# Patient Record
(demographics unavailable — no encounter records)

---

## 2025-04-11 NOTE — BIRTH HISTORY
[At ___ Weeks Gestation] : at [unfilled] weeks gestation [Normal Vaginal Route] : by normal vaginal route [None] : No maternal complications [Status Unknown] : status unknown

## 2025-04-11 NOTE — REVIEW OF SYSTEMS
[Negative] : Heme/Lymph [de-identified] : as per HPI  [de-identified] : as per HPI  [de-identified] : as per HPI

## 2025-04-11 NOTE — CONSULT LETTER
[Consult Letter:] : I had the pleasure of evaluating your patient, [unfilled]. [Please see my note below.] : Please see my note below. [Consult Closing:] : Thank you very much for allowing me to participate in the care of this patient.  If you have any questions, please do not hesitate to contact me. [Sincerely,] : Sincerely, [FreeTextEntry3] : Corey Du MD Pediatric Otolaryngology [address] [address] Tel [phone] Fax [phone]

## 2025-04-11 NOTE — DATA REVIEWED
[FreeTextEntry1] : history obtained from primary caregiver as independent historian due to patient's developmental age and limited recall  referral documents   - OSH documents: NBHS and  [facility] (passed bilaterally)

## 2025-04-11 NOTE — HISTORY OF PRESENT ILLNESS
[Snoring] : snoring [No Personal or Family History of Easy Bruising, Bleeding, or Issues with General Anesthesia] : No Personal or Family History of easy bruising, bleeding, or issues with general anesthesia [de-identified] : 51 day old boy presents with noisy breathing  He was born 38 weeks with  withdrawal from mom's drug use. Per mother patient has tremors and was hospitalized for since birth at [facility]. She is unsure if he passed his NBHS or if he has hx of intubation   This child presents with noisy breathing since birth. It has worsened especially with feeds and laying flat.  Mom states he also cry's excessively.  The patient does have spit ups. Reports constant snoring sounds, mouth breathing Denies witnessed apnea. No throat/tonsil infections. No problems with swallowing or with VPI/Speech/nasal regurgitation Gaining weight since birth  No recent fevers  **Mom does not have a primary care doctor, she is trying to establish care with [facility] pcp **

## 2025-05-08 NOTE — DISCUSSION/SUMMARY
[Normal Growth] : growth [Normal Development] : development  [No Elimination Concerns] : elimination [Continue Regimen] : feeding [No Skin Concerns] : skin [Normal Sleep Pattern] : sleep [None] : no medical problems [Anticipatory Guidance Given] : Anticipatory guidance addressed as per the history of present illness section [Parental (Maternal) Well-Being] : parental (maternal) well-being [Infant-Family Synchrony] : infant-family synchrony [Nutritional Adequacy] : nutritional adequacy [Infant Behavior] : infant behavior [Safety] : safety [Age Approp Vaccines] : Age appropriate vaccines administered [No Medications] : ~He/She~ is not on any medications [Parent/Guardian] : Parent/Guardian [FreeTextEntry1] : STEPH  is a 2 month M with history of NICOLE at birth requiring 1 month NICU stay presenting for 2 month WCC. No interval illness, is doing well today. Is feeding/growing/developing well. Has weight gain of 29.6 g/day. No concerns on physical exam. Is due for vaccines today. Will provide referrals to EI and neurology with concern for eye rolling and body stiffening episodes. Can return in 2 months for 4 month WCC or sooner if concerns.    #Staring episodes - Neuro referral provided  #Milk protein allergy - Continue Nutramigen feeds - Has GI follow-up in 2025  #HCM - Vaccines today: [id number], HepB, PCV20, Rotavirus - Labs today: none - EI referral provided given  history - Anticipatory guidance: Recommend exclusive breastfeeding, 8-12 feedings per day. Mother should continue prenatal vitamins and avoid alcohol. If formula is needed, recommend iron-fortified formulations, 2-4 oz every 3-4 hrs. When in car, patient should be in rear-facing car seat in back seat. Put baby to sleep on back, in own crib with no loose or soft bedding. Help baby to maintain sleep and feeding routines. May offer pacifier if needed. Continue tummy time when awake. Parents counseled to call if rectal temperature >100.4 degrees F. - Can return in 2 months for 4 month WCC

## 2025-05-08 NOTE — PHYSICAL EXAM
[Alert] : alert [Normocephalic] : normocephalic [Flat Open Anterior [place]] : flat open anterior fontanelle [PERRL] : PERRL [Red Reflex Bilateral] : red reflex bilateral [Normally Placed Ears] : normally placed ears [Auricles Well Formed] : auricles well formed [Clear Tympanic membranes] : clear tympanic membranes [Light reflex present] : light reflex present [Bony landmarks visible] : bony landmarks visible [Nares Patent] : nares patent [Palate Intact] : palate intact [Uvula Midline] : uvula midline [Supple, full passive range of motion] : supple, full passive range of motion [Symmetric Chest Rise] : symmetric chest rise [Clear to Auscultation Bilaterally] : clear to auscultation bilaterally [Regular Rate and Rhythm] : regular rate and rhythm [S1, S2 present] : S1, S2 present [+2 Femoral Pulses] : +2 femoral pulses [Soft] : soft [Bowel Sounds] : bowel sounds present [Normal external genitailia] : normal external genitalia [Central Urethral Opening] : central urethral opening [Testicles Descended Bilaterally] : testicles descended bilaterally [Normally Placed] : normally placed [No Abnormal Lymph Nodes Palpated] : no abnormal lymph nodes palpated [Symmetric Flexed Extremities] : symmetric flexed extremities [Startle Reflex] : startle reflex present [Suck Reflex] : suck reflex present [Rooting] : rooting reflex present [Palmar Grasp] : palmar grasp reflex present [Plantar Grasp] : plantar grasp reflex present [Symmetric [place]] : symmetric [place] [Rash and/or lesion present] : rash and/or lesion present [Acute Distress] : no acute distress [Discharge] : no discharge [Palpable Masses] : no palpable masses [Murmurs] : no murmurs [Tender] : nontender [Distended] : not distended [Hepatomegaly] : no hepatomegaly [Splenomegaly] : no splenomegaly [Zuleta-Ortolani] : negative Zuleta-Ortolani [Spinal Dimple] : no spinal dimple [Tuft of Hair] : no tuft of hair [de-identified] : Patches of dry skin on arms, legs and scalp

## 2025-05-08 NOTE — HISTORY OF PRESENT ILLNESS
[Formula ___ oz/feed] : [unfilled] oz of formula per feed [Hours between feeds ___] : Child is fed every [unfilled] hours [Normal] : Normal [___ voids per day] : [unfilled] voids per day [Frequency of stools: ___] : Frequency of stools: [unfilled]  stools [per day] : per day. [In Bassinet/Crib] : sleeps in bassinet/crib [On back] : sleeps on back [Co-sleeping] : co-sleeping [Loose bedding, pillow, toys, and/or bumpers in crib] : loose bedding, pillow, toys, and/or bumpers in crib [Pacifier use] : Pacifier use [No] : No cigarette smoke exposure [Rear facing car seat in back seat] : Rear facing car seat in back seat [Carbon Monoxide Detectors] : Carbon monoxide detectors at home [Smoke Detectors] : Smoke detectors at home. [NO] : No [Exposure to electronic nicotine delivery system] : No exposure to electronic nicotine delivery system [At risk for exposure to TB] : Not at risk for exposure to Tuberculosis  [de-identified] : Foster Mother [FreeTextEntry7] : Ex-38 week born at [facility] to mom without prenatal care. Had one month NICU stay after birth for NICOLE in setting of positive meconium tox for cocaine and fentanyl. Is currently in custody of foster mother who lives in [place] but pursues care for all her foster children here. Was found to have milk protein allergy, discharged on Nutramigen, has follow-up with GI in July.  Has dry skin, eczema, has been applying Aveeno with barrier cream   Loud snoring, saw ENT with negative scope, trialing suction and saline drops which doesn't help  Has intermittent episodes of eye rolling and body stiffening which spontaneously resolves after a few seconds, but appears sleepy afterwards. [de-identified] : Nutramigen Hypoallergenic

## 2025-05-08 NOTE — DEVELOPMENTAL MILESTONES
[Normal Development] : Normal Development [None] : none [Smiles responsively] : smiles responsively [Lifts head and chest in prone] : lifts head and chest in prone [Opens and shuts hands] : opens and shuts hands [Vocalizes with simple cooing] : does not vocalize with simple cooing [FreeTextEntry1] : Does tummy time 2x a day

## 2025-05-21 NOTE — HISTORY OF PRESENT ILLNESS
[de-identified] : This is a 3 mo male patient here for further evaluation of milk protein allergy, reflux and fussiness.  I reviewed the notes from the pediatrician.  He is an ex full-term male born at [facility] with a history of  abstinence syndrome.  Meconium was positive for cocaine and fentanyl.  He was in the NICU for about a month and started on Nutramigen for milk protein allergy at that time.  Arrived to mom's house and March. . He has been on nutramigen and still stooling often. Stooling 6-7x per day or more. There is no blood or mucous in the stool. Watery at times or soft. He is frequently crying. He has rashes, bad eczema, was on nutramigen milk did not tolerate Alimentum.  Crying has continued. Dry patches on his skin. Mom using aveeno.  She suspects eczema.  He eats well, takes 4 -5oz per feed, gaining weight. Comfortable when eating but milk spills out and his latch seems to be weak. Fussy after eating, seems to have stomach pain, passes gas.  He was on no meds at [place], still had tremors that have continued, seems to improved but happens more than but happens more than mom would expect.  Mom also notes that he does not make much eye contact and frequently looks over her.  She has been concerned about his development

## 2025-05-21 NOTE — PHYSICAL EXAM
[Well Developed] : well developed [Well Nourished] : well nourished [NAD] : in no acute distress [PERRL] : pupils were equal, round, reactive to light  [Moist & Pink Mucous Membranes] : moist and pink mucous membranes [CTAB] : lungs clear to auscultation bilaterally [Regular Rate and Rhythm] : regular rate and rhythm [Normal S1, S2] : normal S1 and S2 [Soft] : soft  [Normal Bowel Sounds] : normal bowel sounds [No HSM] : no hepatosplenomegaly appreciated [Normal Tone] : normal tone [Well-Perfused] : well-perfused [icteric] : anicteric [Respiratory Distress] : no respiratory distress  [Wheeze] : no wheezing  [Murmur] : no murmur [Distended] : non distended [Tender] : non tender [Stool Palpable] : no stool palpable [Mass ___ cm] : no masses were palpated [Edema] : no edema [Cyanosis] : no cyanosis [Rash] : no rash [Jaundice] : no jaundice [de-identified] : Did not make much eye contact

## 2025-05-21 NOTE — PHYSICAL EXAM
[Well Developed] : well developed [Well Nourished] : well nourished [NAD] : in no acute distress [PERRL] : pupils were equal, round, reactive to light  [Moist & Pink Mucous Membranes] : moist and pink mucous membranes [CTAB] : lungs clear to auscultation bilaterally [Regular Rate and Rhythm] : regular rate and rhythm [Normal S1, S2] : normal S1 and S2 [Soft] : soft  [Normal Bowel Sounds] : normal bowel sounds [No HSM] : no hepatosplenomegaly appreciated [Normal Tone] : normal tone [Well-Perfused] : well-perfused [icteric] : anicteric [Respiratory Distress] : no respiratory distress  [Wheeze] : no wheezing  [Murmur] : no murmur [Distended] : non distended [Tender] : non tender [Stool Palpable] : no stool palpable [Mass ___ cm] : no masses were palpated [Edema] : no edema [Cyanosis] : no cyanosis [Rash] : no rash [Jaundice] : no jaundice [de-identified] : Did not make much eye contact

## 2025-05-21 NOTE — ASSESSMENT
[FreeTextEntry1] : 3-month-old male with history of  abstinence syndrome here for evaluation of frequent stools, reflux and fussiness.  He has been on Nutramigen and continues to have frequent loose stools and discomfort.  He has intermittent reflux and mom notes he is fussy frequently.  He is gaining weight.  He does seem to have some difficulty with eye contact.  Recommend: -Trial of Puramino or Neocate, mom to call if he is doing well and we will send her a WIC form - Agree with evaluation by neurology - Would benefit from a NICU follow-up program if available - Family instructed to call if any questions/concerns, recommend they set up a follow-up visit in 2 months

## 2025-05-21 NOTE — HISTORY OF PRESENT ILLNESS
[de-identified] : This is a 3 mo male patient here for further evaluation of milk protein allergy, reflux and fussiness.  I reviewed the notes from the pediatrician.  He is an ex full-term male born at [facility] with a history of  abstinence syndrome.  Meconium was positive for cocaine and fentanyl.  He was in the NICU for about a month and started on Nutramigen for milk protein allergy at that time.  Arrived to mom's house and March. . He has been on nutramigen and still stooling often. Stooling 6-7x per day or more. There is no blood or mucous in the stool. Watery at times or soft. He is frequently crying. He has rashes, bad eczema, was on nutramigen milk did not tolerate Alimentum.  Crying has continued. Dry patches on his skin. Mom using aveeno.  She suspects eczema.  He eats well, takes 4 -5oz per feed, gaining weight. Comfortable when eating but milk spills out and his latch seems to be weak. Fussy after eating, seems to have stomach pain, passes gas.  He was on no meds at [place], still had tremors that have continued, seems to improved but happens more than but happens more than mom would expect.  Mom also notes that he does not make much eye contact and frequently looks over her.  She has been concerned about his development

## 2025-05-21 NOTE — CONSULT LETTER
[Dear  ___] : Dear  [unfilled], [Consult Letter:] : I had the pleasure of evaluating your patient, [unfilled]. [Please see my note below.] : Please see my note below. [Consult Closing:] : Thank you very much for allowing me to participate in the care of this patient.  If you have any questions, please do not hesitate to contact me. [Sincerely,] : Sincerely, [FreeTextEntry3] : Bessie Mchugh MD Attending Physician Pediatric Gastroenterology and Nutrition

## 2025-07-10 NOTE — HISTORY OF PRESENT ILLNESS
[Formula ___ oz/feed] : [unfilled] oz of formula per feed [___ Feeding per 24 hrs] : a  total of [unfilled] feedings in 24 hours [___ voids per day] : [unfilled] voids per day [Frequency of stools: ___] : Frequency of stools: [unfilled]  stools [per day] : per day. [Dark green] : dark green [Loose] : loose consistency [In Bassinet/Crib] : sleeps in bassinet/crib [Sleeps 12-16 hours per 24 hours (including naps)] : sleeps 12-16 hours per 24 hours (including naps) [Tummy time] : tummy time [No] : No cigarette smoke exposure [Rear facing car seat in back seat] : Rear facing car seat in back seat [Carbon Monoxide Detectors] : Carbon monoxide detectors at home [Smoke Detectors] : Smoke detectors at home. [de-identified] : Feeds every 3.5-4hrs [NO] : No [Hours between feeds ___] : Child is fed every [unfilled] hours [Vitamins ___] : no vitamins [Fruits] : no fruits [Vegetables] : no vegetables [Cereal] : no cereal [Pacifier use] : not using pacifier [de-identified] : Foster Mom [de-identified] : Red rash/bruise; inner arm one episode resolved in the day, back second episode; Started in June 4x total  EEG June 10th; fussy, stiff; looks over his shoulder; staring episodes daily, 5-minutes; concern patient is not tracking On nutramigen, GI formula did not work-out Throwing up, episodes of gagging/choking, about 3x weekly; started 2-weeks ago [FreeTextEntry8] : Liquid/watery BM at least 1-2x daily

## 2025-07-10 NOTE — DEVELOPMENTAL MILESTONES
[Laughs aloud] : laughs aloud [Vocalizes with extending cooing] : vocalizes with extending cooing [Keeps hands unfisted] : keeps hands unfisted [Plays with fingers in midline] : plays with fingers in midline [Passed] : passed [Yes] : Completed. [Turns to voice] : does not turn to voice [Rolls over prone to supine] : does not roll over prone to supine [Supports on elbows & wrists in prone] : does not support on elbows and wrists in prone [Grasps objects] : does not grasp objects [FreeTextEntry1] : Patient with moderate head control still bobbing, startles to very loud noises occasionally when awake; lifts head and chest during tummy time but has trouble bringing arms in to use elbows for support [FreeTextEntry2] : 4

## 2025-07-10 NOTE — DISCUSSION/SUMMARY
[Normal Growth] : growth [No Elimination Concerns] : elimination [Normal Sleep Pattern] : sleep [Term Infant] : term infant [Appetite Normal] : normal appetite [Nevus Flammeus ___(location)] : nevus flammeus located on the [unfilled] [Family Functioning] : family functioning [Nutritional Adequacy and Growth] : nutritional adequacy and growth [Infant Development] : infant development [Oral Health] : oral health [Safety] : safety [Age Approp Vaccines] : Age appropriate vaccines administered [No Medications] : ~He/She~ is not on any medications [Parent/Guardian] : Parent/Guardian [FreeTextEntry1] : Janell is a 4mo boy with history of  abstinence syndrome s/p 1-month NICU stay, milk protein allergy, and eczema.  Currently in foster care   #4-month WCC - 4-month vaccines given - Discussed all relevant anticipatory guidance - Patient to continue follow-up per neurology and GI recommendations  #Weight gain -  Patient went from 1%-29% - Discussed with foster mom that this and occasional emesis is likely 2/2 overfeeding and advised reducing feeds to 5oz 3-4 hours and follow patient cues  #Candidial diaper rash - Rx for nystatin - Discussed keeping diaper area and skin folds dry, all relevant anticipatory guidance discussed  #Concern for hearing - Patient was able to track faces and objects throughout exam, however, does not turn towards foster mom's or brother's voice - Referral to audiology

## 2025-07-10 NOTE — REVIEW OF SYSTEMS
[Irritable] : irritability [Fussy] : fussy [Crying] : crying [Spitting Up] : spitting up [Vomiting] : vomiting [Diarrhea] : diarrhea [Gaseous] : gaseous [Hypertonicity] : hypertonic [Dry Skin] : dry skin [Easy Bruising] : easy bruising [Restriction of Motion] : restriction of motion [Eye Discharge] : no eye discharge [Nasal Discharge] : no nasal discharge [Nasal Congestion] : no nasal congestion [Tachypnea] : not tachypneic [Wheezing] : no wheezing [Cough] : no cough [Constipation] : no constipation [Rash] : no rash [Dysuria] : no dysuria

## 2025-07-10 NOTE — HISTORY OF PRESENT ILLNESS
[Formula ___ oz/feed] : [unfilled] oz of formula per feed [___ Feeding per 24 hrs] : a  total of [unfilled] feedings in 24 hours [___ voids per day] : [unfilled] voids per day [Frequency of stools: ___] : Frequency of stools: [unfilled]  stools [per day] : per day. [Dark green] : dark green [Loose] : loose consistency [In Bassinet/Crib] : sleeps in bassinet/crib [Sleeps 12-16 hours per 24 hours (including naps)] : sleeps 12-16 hours per 24 hours (including naps) [Tummy time] : tummy time [No] : No cigarette smoke exposure [Rear facing car seat in back seat] : Rear facing car seat in back seat [Carbon Monoxide Detectors] : Carbon monoxide detectors at home [Smoke Detectors] : Smoke detectors at home. [de-identified] : Feeds every 3.5-4hrs [NO] : No [Hours between feeds ___] : Child is fed every [unfilled] hours [Vitamins ___] : no vitamins [Fruits] : no fruits [Vegetables] : no vegetables [Cereal] : no cereal [Pacifier use] : not using pacifier [de-identified] : Foster Mom [de-identified] : Red rash/bruise; inner arm one episode resolved in the day, back second episode; Started in June 4x total  EEG June 10th; fussy, stiff; looks over his shoulder; staring episodes daily, 5-minutes; concern patient is not tracking On nutramigen, GI formula did not work-out Throwing up, episodes of gagging/choking, about 3x weekly; started 2-weeks ago [FreeTextEntry8] : Liquid/watery BM at least 1-2x daily

## 2025-07-10 NOTE — PHYSICAL EXAM
[Alert] : alert [Playful] : playful [Normocephalic] : normocephalic [Flat Open Anterior [place]] : flat open anterior fontanelle [Red Reflex] : red reflex bilateral [Conjunctivae with no discharge] : conjunctivae with no discharge [PERRL] : PERRL [EOMI Bilateral] : EOMI bilateral [Normally Placed Ears] : normally placed ears [Auricles Well Formed] : auricles well formed [Clear Tympanic membranes] : clear tympanic membranes [Nares Patent] : nares patent [Pink Nasal Mucosa] : pink nasal mucosa [Palate Intact] : palate intact [Uvula Midline] : uvula midline [Trachea Midline] : trachea midline [Supple, full passive range of motion] : supple, full passive range of motion [Symmetric Chest Rise] : symmetric chest rise [Clear to Auscultation Bilaterally] : clear to auscultation bilaterally [Regular Rate and Rhythm] : regular rate and rhythm [S1, S2 present] : S1, S2 present [Soft] : soft [Bowel Sounds] : bowel sounds present [Normal External Genitalia] : normal external genitalia [Testicles Descended] : testicles descended bilaterally [Patent] : patent [Normally Placed] : normally placed [Symmetric Buttocks Creases] : symmetric buttocks creases [Straight] : straight [Crying] : not crying [Discharge] : no discharge [Murmurs] : no murmurs [Tender] : nontender [Distended] : nondistended [Hepatomegaly] : no hepatomegaly [Splenomegaly] : no splenomegaly [Circumcised] : not circumcised [Zuleta-Ortolani] : negative Zuleta-Ortolani [Spinal Dimple] : no spinal dimple [Tuft of Hair] : no tuft of hair [FreeTextEntry1] : Laying on bed, periodic episodes of hypertonicity which self- resolve, tracking faces throughout exam [de-identified] : Mild erythema and moisture in neck folds [de-identified] : Perianal erythema with satellite lesions to buttock and posterior thighs bilaterally [de-identified] : Hypertonicity episodes impairing patient's ability to support head and chest when prone, sitting, and standing; when normotonic able to support self while prone and sitting. 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

## 2025-07-10 NOTE — PHYSICAL EXAM
[Alert] : alert [Playful] : playful [Normocephalic] : normocephalic [Flat Open Anterior [place]] : flat open anterior fontanelle [Red Reflex] : red reflex bilateral [Conjunctivae with no discharge] : conjunctivae with no discharge [PERRL] : PERRL [EOMI Bilateral] : EOMI bilateral [Normally Placed Ears] : normally placed ears [Auricles Well Formed] : auricles well formed [Clear Tympanic membranes] : clear tympanic membranes [Nares Patent] : nares patent [Pink Nasal Mucosa] : pink nasal mucosa [Palate Intact] : palate intact [Uvula Midline] : uvula midline [Trachea Midline] : trachea midline [Supple, full passive range of motion] : supple, full passive range of motion [Symmetric Chest Rise] : symmetric chest rise [Clear to Auscultation Bilaterally] : clear to auscultation bilaterally [Regular Rate and Rhythm] : regular rate and rhythm [S1, S2 present] : S1, S2 present [Soft] : soft [Bowel Sounds] : bowel sounds present [Normal External Genitalia] : normal external genitalia [Testicles Descended] : testicles descended bilaterally [Patent] : patent [Normally Placed] : normally placed [Symmetric Buttocks Creases] : symmetric buttocks creases [Straight] : straight [Crying] : not crying [Discharge] : no discharge [Murmurs] : no murmurs [Tender] : nontender [Distended] : nondistended [Hepatomegaly] : no hepatomegaly [Splenomegaly] : no splenomegaly [Circumcised] : not circumcised [Zuleta-Ortolani] : negative Zuleta-Ortolani [Spinal Dimple] : no spinal dimple [Tuft of Hair] : no tuft of hair [FreeTextEntry1] : Laying on bed, periodic episodes of hypertonicity which self- resolve, tracking faces throughout exam [de-identified] : Mild erythema and moisture in neck folds [de-identified] : Perianal erythema with satellite lesions to buttock and posterior thighs bilaterally [de-identified] : Hypertonicity episodes impairing patient's ability to support head and chest when prone, sitting, and standing; when normotonic able to support self while prone and sitting. 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

## 2025-07-21 NOTE — HISTORY OF PRESENT ILLNESS
[de-identified] : This is a 5 mo male patient here for follow-up of milk protein allergy, reflux and fussiness.  I reviewed the notes from the pediatrician and neurology.  He had a normal EEG.  He is an ex full-term male born at [facility] with a history of  abstinence syndrome.  Meconium was positive for cocaine and fentanyl.  He was in the NICU for about a month and started on Nutramigen for milk protein allergy at that time.  Arrived to mom's house and March.  He has been on nutramigen and was stooling g 6-7x per day or more.  He was frequently crying and had tremors.  Mom had concerns about his tracking and development after the last visit I recommended a trial of elemental formula.  He is here for follow-up visit.  He is doing ok, good weight gain.  He stools 3-4x per day and very watery.  There is stool that mom could collect if needed.  There is no blood or mucous in the stool.  He is on nutramigen, mom tried the puramino and neocate  and nothing changed with the stooling. Mom says he was on them for 2 wks and no change in the stool frequency or consistency.  Spitting up occ, not frequent. Eating has been better, still has some issues with the latch, milk comes out his mouth.  No gagging.  Occ  rashes on and off.  Black and blue marks on occasion. Pop up and stays 24 hrs, they do not fade or turn yellow.  Mom mentioned them to the pediatrician.  Mom has a picture though the patient has no marks on his skin today at the visit.  Of note he was making good eye contact and smiling today and also looking back to mom during the exam

## 2025-07-21 NOTE — ASSESSMENT
[FreeTextEntry1] : 5-month-old male with history of  abstinence syndrome here for follow-up of frequent stools, reflux and fussiness.  He has been on Nutramigen and stool frequency has decreased to about 4 stools per day.  There was no improvement on 2 different elemental formulas.  He is gaining weight and has mild reflux.  Recommend: - Continue Nutramigen - Agree with follow-up with neurology and the pediatrician. -The picture mom has of the raudel on his skin looks like it could be a photoreaction to citrus fruits so asked mom to keep an eye on that as she does cook with lime however if it continues happening mom should bring the baby to the pediatrician when a raudel is present for further evaluation as bruising? in infants is always concerning.  Mom says she is with him all the time and there are no concerns for abuse.  There are no marks on his skin today - Discussed the introduction of solids and to avoid dairy products.  - Family instructed to call if any questions/concerns, recommend they set up a follow-up visit when he is 8 months old
No

## 2025-07-21 NOTE — CONSULT LETTER
[Dear  ___] : Dear  [unfilled], [Please see my note below.] : Please see my note below. [Consult Closing:] : Thank you very much for allowing me to participate in the care of this patient.  If you have any questions, please do not hesitate to contact me. [Sincerely,] : Sincerely, [Courtesy Letter:] : I had the pleasure of seeing your patient, [unfilled], in my office today. [FreeTextEntry3] : Bessie Mchugh MD Attending Physician Pediatric Gastroenterology and Nutrition

## 2025-07-21 NOTE — PHYSICAL EXAM
[Well Developed] : well developed [Well Nourished] : well nourished [NAD] : in no acute distress [PERRL] : pupils were equal, round, reactive to light  [Moist & Pink Mucous Membranes] : moist and pink mucous membranes [CTAB] : lungs clear to auscultation bilaterally [Regular Rate and Rhythm] : regular rate and rhythm [Normal S1, S2] : normal S1 and S2 [Soft] : soft  [Normal Bowel Sounds] : normal bowel sounds [No HSM] : no hepatosplenomegaly appreciated [Normal Tone] : normal tone [Well-Perfused] : well-perfused [icteric] : anicteric [Respiratory Distress] : no respiratory distress  [Wheeze] : no wheezing  [Murmur] : no murmur [Distended] : non distended [Tender] : non tender [Stool Palpable] : no stool palpable [Mass ___ cm] : no masses were palpated [Edema] : no edema [Cyanosis] : no cyanosis [Rash] : no rash [Jaundice] : no jaundice [Interactive] : interactive [Appropriate Affect] : appropriate affect [Appropriate Behavior] : appropriate behavior